# Patient Record
Sex: FEMALE | URBAN - METROPOLITAN AREA
[De-identification: names, ages, dates, MRNs, and addresses within clinical notes are randomized per-mention and may not be internally consistent; named-entity substitution may affect disease eponyms.]

---

## 2022-07-09 ENCOUNTER — TELEPHONE ENCOUNTER (OUTPATIENT)
Dept: URBAN - METROPOLITAN AREA CLINIC 121 | Facility: CLINIC | Age: 72
End: 2022-07-09

## 2022-07-10 ENCOUNTER — TELEPHONE ENCOUNTER (OUTPATIENT)
Dept: URBAN - METROPOLITAN AREA CLINIC 121 | Facility: CLINIC | Age: 72
End: 2022-07-10

## 2022-07-30 ENCOUNTER — TELEPHONE ENCOUNTER (OUTPATIENT)
Age: 72
End: 2022-07-30

## 2022-07-30 RX ORDER — BISMUTH SUBSALICYLATE 262 MG
2 (TWO) TABLET,CHEWABLE ORAL
Qty: 0 | Refills: 2 | OUTPATIENT
Start: 2018-02-05 | End: 2018-03-07

## 2022-07-30 RX ORDER — SACCHAROMYCES BOULARDII 250 MG
1 (ONE) CAPSULE ORAL
Qty: 0 | Refills: 2 | OUTPATIENT
Start: 2016-07-25 | End: 2016-08-24

## 2022-07-30 RX ORDER — BUDESONIDE 3 MG/1
3 (THREE) CAPSULE DR PART CAPSULE, COATED PELLETS ORAL DAILY
Qty: 0 | Refills: 2 | OUTPATIENT
Start: 2018-01-19 | End: 2018-02-18

## 2022-07-30 RX ORDER — BUDESONIDE 3 MG/1
3 (THREE) CAPSULE, COATED PELLETS ORAL DAILY
Qty: 0 | Refills: 2 | OUTPATIENT
Start: 2018-03-29 | End: 2018-04-28

## 2022-07-30 RX ORDER — MESALAMINE 1.2 G/1
3 (THREE) TABLET, DELAYED RELEASE ORAL DAILY
Qty: 0 | Refills: 5 | OUTPATIENT
Start: 2018-02-06 | End: 2018-02-06

## 2022-07-31 ENCOUNTER — TELEPHONE ENCOUNTER (OUTPATIENT)
Age: 72
End: 2022-07-31

## 2022-07-31 RX ORDER — MESALAMINE 1.2 G/1
3 (THREE) TABLET, DELAYED RELEASE ORAL DAILY
Qty: 0 | Refills: 5 | Status: ACTIVE | COMMUNITY
Start: 2018-02-05

## 2022-07-31 RX ORDER — SACCHAROMYCES BOULARDII 250 MG
1 (ONE) CAPSULE ORAL
Qty: 0 | Refills: 2 | Status: ACTIVE | COMMUNITY
Start: 2016-07-25

## 2022-07-31 RX ORDER — BUDESONIDE 3 MG/1
3 (THREE) CAPSULE, COATED PELLETS ORAL DAILY
Qty: 0 | Refills: 2 | Status: ACTIVE | COMMUNITY
Start: 2018-03-29

## 2022-07-31 RX ORDER — BUDESONIDE 3 MG/1
3 (THREE) CAPSULE, COATED PELLETS ORAL DAILY
Qty: 0 | Refills: 5 | Status: ACTIVE | COMMUNITY
Start: 2017-03-30

## 2022-07-31 RX ORDER — BISMUTH SUBSALICYLATE 262 MG
2 (TWO) TABLET,CHEWABLE ORAL
Qty: 0 | Refills: 2 | Status: ACTIVE | COMMUNITY
Start: 2018-02-05

## 2022-07-31 RX ORDER — FAMOTIDINE 10 MG
2 (TWO) TABLET ORAL DAILY
Qty: 0 | Refills: 16 | Status: ACTIVE | COMMUNITY
Start: 2019-05-08

## 2022-07-31 RX ORDER — BUDESONIDE 3 MG/1
3 (THREE) CAPSULE, COATED PELLETS ORAL DAILY
Qty: 0 | Refills: 5 | Status: ACTIVE | COMMUNITY
Start: 2016-09-14

## 2022-07-31 RX ORDER — FAMOTIDINE 10 MG
2 (TWO) TABLET ORAL DAILY
Qty: 0 | Refills: 16 | Status: ACTIVE | COMMUNITY
Start: 2019-05-09

## 2022-07-31 RX ORDER — BUDESONIDE 3 MG/1
3 (THREE) CAPSULE DR PART CAPSULE, COATED PELLETS ORAL DAILY
Qty: 0 | Refills: 2 | Status: ACTIVE | COMMUNITY
Start: 2018-01-19

## 2024-02-28 ENCOUNTER — LAB (OUTPATIENT)
Dept: URBAN - METROPOLITAN AREA CLINIC 7 | Facility: CLINIC | Age: 74
End: 2024-02-28

## 2024-02-28 ENCOUNTER — OV EP (OUTPATIENT)
Dept: URBAN - METROPOLITAN AREA CLINIC 7 | Facility: CLINIC | Age: 74
End: 2024-02-28
Payer: COMMERCIAL

## 2024-02-28 VITALS
TEMPERATURE: 97.7 F | DIASTOLIC BLOOD PRESSURE: 62 MMHG | BODY MASS INDEX: 17.33 KG/M2 | WEIGHT: 104 LBS | HEIGHT: 65 IN | SYSTOLIC BLOOD PRESSURE: 90 MMHG

## 2024-02-28 DIAGNOSIS — Z80.0 FAMILY HX OF COLON CANCER: ICD-10-CM

## 2024-02-28 DIAGNOSIS — Z86.19 HX OF CLOSTRIDIUM DIFFICILE INFECTION: ICD-10-CM

## 2024-02-28 DIAGNOSIS — Z87.19 HX OF INTESTINAL OBSTRUCTION: ICD-10-CM

## 2024-02-28 DIAGNOSIS — R19.4 CHANGE IN BOWEL HABITS: ICD-10-CM

## 2024-02-28 PROBLEM — 312824007: Status: ACTIVE | Noted: 2024-02-28

## 2024-02-28 PROBLEM — 88111009: Status: ACTIVE | Noted: 2024-02-28

## 2024-02-28 PROBLEM — 328021000119108: Status: ACTIVE | Noted: 2024-02-28

## 2024-02-28 PROBLEM — 470151000124108: Status: ACTIVE | Noted: 2024-02-28

## 2024-02-28 PROCEDURE — 99214 OFFICE O/P EST MOD 30 MIN: CPT | Performed by: INTERNAL MEDICINE

## 2024-02-28 RX ORDER — BISOPROLOL FUMARATE 5 MG/1
TABLET ORAL
Qty: 45 TABLET | Status: ACTIVE | COMMUNITY

## 2024-02-28 RX ORDER — MESALAMINE 1.2 G/1
3 (THREE) TABLET, DELAYED RELEASE ORAL DAILY
Qty: 0 | Refills: 5 | Status: ON HOLD | COMMUNITY
Start: 2018-02-05

## 2024-02-28 RX ORDER — STANDARDIZED SENNA CONCENTRATE 8.6 MG/1
2 TABLETS AT BEDTIME AS NEEDED TABLET ORAL ONCE A DAY
Status: ACTIVE | COMMUNITY

## 2024-02-28 NOTE — HPI-TODAY'S VISIT:
Hx admission with bowel obstruction that resolved with conservative managment. Was then dc from rehab in november . [facility] 4 weeks ago noted a change in bowel habits with need for laxative use and then subsequent increased BMs. Over the last 3 days noted diarrheal sxs that have been recurrent. No current abdominal  pain. Hx recurrent c diff after admission with hip fracture aprox 2 years ago. Not associated with nausea,vomiting ,early satiety and dyspepsia. No recent fevers or chills No weight loss No dark urine or alcoholic stools. No significant NSAID use history.  Family hx CRC Hx microscopic colitis.

## 2024-06-25 ENCOUNTER — TELEPHONE ENCOUNTER (OUTPATIENT)
Dept: URBAN - METROPOLITAN AREA CLINIC 7 | Facility: CLINIC | Age: 74
End: 2024-06-25